# Patient Record
Sex: FEMALE | Race: WHITE | NOT HISPANIC OR LATINO | Employment: STUDENT | URBAN - METROPOLITAN AREA
[De-identification: names, ages, dates, MRNs, and addresses within clinical notes are randomized per-mention and may not be internally consistent; named-entity substitution may affect disease eponyms.]

---
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
## 2021-05-30 VITALS — HEIGHT: 66 IN | WEIGHT: 153 LBS | BODY MASS INDEX: 24.59 KG/M2

## 2021-05-31 VITALS — HEIGHT: 66 IN | WEIGHT: 160.8 LBS | BODY MASS INDEX: 25.84 KG/M2

## 2021-05-31 VITALS — BODY MASS INDEX: 27.79 KG/M2 | HEIGHT: 65 IN | WEIGHT: 166.8 LBS

## 2021-06-01 VITALS — HEIGHT: 66 IN | BODY MASS INDEX: 27 KG/M2 | WEIGHT: 168 LBS

## 2021-06-02 VITALS — WEIGHT: 174.6 LBS | BODY MASS INDEX: 28.18 KG/M2

## 2021-06-10 NOTE — PROGRESS NOTES
Assessment/Plan:     1. Stress  2. Palpitations  No signs of acute concern at this time.  EKG done as below and was normal.  Discussed ongoing options for workup.  Mother and patient do believe that they are somewhat stress related.  Mother is a psychologist and  declined referral.  She plans to work on breathing techniques with patient.  Did recommend that may be helpful for patient to see someone other than mother and they do know some people they can talk to.  Declined the need for medications.  Gave reassurance of normal vitals.  If symptoms worsen or do not improve would consider lab recheck Holter monitor and further workup as needed.  - Electrocardiogram Perform and Read: Which I personally reviewed and interpreted as normal sinus rhythm without any significant concerns.  Comes in today with her mother        Subjective:      Maryjo Kinney is a 16 y.o. female to discuss palpitations.  Is my first time meeting with them so briefly reviewed past medical history and previous notes from care provider.  Reviewed the labs that she had done August 2016.  Thyroid hemoglobin were normal she did have a mild elevated testosterone and plans to recheck that with primary care provider in a few months.  The concern is that patient has had more frequent palpitations.  She states she began feeling aware of changes with her heart about 2 months ago.  She states that they have become more frequent sometimes she can feel it 3-4 times a day.  It will last just a few seconds and will then go back to normal.  She states sometimes she will feel short of breath for those few seconds but then feels okay afterwards.  She does not describe any pain but states it feels like someone is squeezing her heart and sometimes the beat will be rapid for those few seconds.  She has no swelling does not get dizzy or pass out.  She states that it is not related to activity and sometimes it will happen just as she sitting in class.  She has not  tried anything to make it better or worse.  Mom is with her today.  Mom is a psychologist and states that originally they thought it was due to stress.  Patient is a perfectionist and puts a lot of stress on herself a lot of stress in her school work.  She thinks that she seems stressed getting worse.  I do see in her past medical history she is a history of depression but the believe that mood symptoms have been pretty well controlled up until recently with this to stress.  Declines need for any medication or referral to psychologist.  She does not have any palpitations in the office.  Vitals are stable.  Her menstrual cycle has been normal no significant changes in weight.  No swelling in throat.  No other new concerns today.    Current Outpatient Prescriptions   Medication Sig Dispense Refill     ketoconazole (NIZORAL) 2 % cream Apply to the affected area twice daily 30 g 2     No current facility-administered medications for this visit.        Past Medical History, Family History, and Social History reviewed.  Past Medical History:   Diagnosis Date     Depression      Headache      No past surgical history on file.  Review of patient's allergies indicates no known allergies.  Family History   Problem Relation Age of Onset     No Medical Problems Mother      No Medical Problems Father      Hypertension Maternal Grandmother      Hypertension Maternal Grandfather      Heart disease Maternal Grandfather      Social History     Social History     Marital status: Single     Spouse name: N/A     Number of children: N/A     Years of education: N/A     Occupational History     Not on file.     Social History Main Topics     Smoking status: Never Smoker     Smokeless tobacco: Not on file     Alcohol use No     Drug use: No     Sexual activity: No     Other Topics Concern     Not on file     Social History Narrative         Review of systems is as stated in HPI, and the remainder of the 10 system review is otherwise  "negative.    Objective:     Vitals:    04/28/17 1301   BP: 110/78   Pulse: 64   Weight: 153 lb (69.4 kg)   Height: 5' 5.5\" (1.664 m)    Body mass index is 25.07 kg/(m^2).    General Appearance:    Alert, cooperative, no distress, appears stated age   Head:    Normocephalic, without obvious abnormality, atraumatic   Eyes:    PERRL, EOM's intact   Ears:    Normal external ear canals   Nose:   Mucosa normal, no drainage     or sinus tenderness   Throat:   Oropharynx is clear   Neck:   Supple, symmetrical, no adenopathy, no thyromegally, no carotid bruit        Lungs:     Clear to auscultation bilaterally, respirations unlabored   Chest Wall:    No tenderness or deformity    Heart:    Regular rate and rhythm, S1 and S2 normal, no murmur, rub    or gallop       Abdomen:     Soft, non-tender, bowel sounds active all four quadrants,     no masses, no organomegaly           Extremities:   Extremities normal, atraumatic, no cyanosis or edema   Pulses:   2+ and symmetric all extremities   Skin  Psych:   No rashes or lesions  Mood and affect without acute anxiety or psychoses.           This note has been dictated using voice recognition software. Any grammatical or context distortions are unintentional and inherent to the the software.   "

## 2021-06-13 NOTE — PROGRESS NOTES
Assessment/Plan:     1. Migraine  As possibly getting labs referral to neurologist for further workup they declined will give trial of birth control pills to stop hormonal fluctuation and Flexeril since that works well for mom recommended stretching they declined physical therapy or massage.  Color return to care if symptoms worsen or do not improve.  Mother and child are content with plan  - levonorgestrel-ethinyl estradiol (SRONYX) 0.1-20 mg-mcg per tablet; Take 1 tablet by mouth daily.  Dispense: 3 Package; Refill: 3  - cyclobenzaprine (FLEXERIL) 5 MG tablet; Take 1-2 tablets (5-10 mg total) by mouth 3 (three) times a day as needed for muscle spasms.  Dispense: 45 tablet; Refill: 6          Subjective:      Maryjo Kinney is a 17 y.o. female in today to discuss patient is here with her mother.  Mother states that migraines do run in the family and mother has difficulty controlling her's.  Mother states she was recently switched to Flexeril and wonders if that will help the patient because many of the typical medications do not help mother.  Does state that migraines are worse just before her period.  They have tried aspirin and ibuprofen.  She has had her eyes checked recently and states they were okay.  It is worse when she is having stress.  They have tried to change her diet tried to hydrate frequently.  Patient is under significant amount of stress right now because of school.  She is not sexually active.  States her periods are pretty regular they want her birth control pills will help.  They do not like the idea of putting her on regular migraine medication at this time.  She does not currently have a headache but states that they are coming more frequent they have been getting them almost every day now but again worse towards the end of her period.  No neck pain.  Please saw her for palpitations some time ago states this has improved no further workup or treatment needed.    Current Outpatient  "Prescriptions   Medication Sig Dispense Refill     cyclobenzaprine (FLEXERIL) 5 MG tablet Take 1-2 tablets (5-10 mg total) by mouth 3 (three) times a day as needed for muscle spasms. 45 tablet 6     ketoconazole (NIZORAL) 2 % cream Apply to the affected area twice daily 30 g 2     levonorgestrel-ethinyl estradiol (SRONYX) 0.1-20 mg-mcg per tablet Take 1 tablet by mouth daily. 3 Package 3     No current facility-administered medications for this visit.      No Known Allergies  Past Medical History, Family History, and Social History reviewed.  Past Medical History:   Diagnosis Date     Depression      Headache      No past surgical history on file.  Review of patient's allergies indicates no known allergies.  Family History   Problem Relation Age of Onset     No Medical Problems Mother      No Medical Problems Father      Hypertension Maternal Grandmother      Hypertension Maternal Grandfather      Heart disease Maternal Grandfather      Social History     Social History     Marital status: Single     Spouse name: N/A     Number of children: N/A     Years of education: N/A     Occupational History     Not on file.     Social History Main Topics     Smoking status: Never Smoker     Smokeless tobacco: Not on file     Alcohol use No     Drug use: No     Sexual activity: No     Other Topics Concern     Not on file     Social History Narrative         Review of systems is as stated in HPI, and the remainder of the 10 system review is otherwise negative.    Objective:     Vitals:    10/24/17 1612   BP: 112/70   Patient Site: Left Arm   Patient Position: Sitting   Cuff Size: Adult Regular   Pulse: 64   Resp: 20   Temp: 98.2  F (36.8  C)   TempSrc: Oral   Weight: 160 lb 12.8 oz (72.9 kg)   Height: 5' 5.75\" (1.67 m)    Body mass index is 26.15 kg/(m^2).  Wt Readings from Last 3 Encounters:   10/24/17 160 lb 12.8 oz (72.9 kg) (92 %, Z= 1.41)*   04/28/17 153 lb (69.4 kg) (90 %, Z= 1.26)*   08/19/16 167 lb 3.2 oz (75.8 kg) (95 " %, Z= 1.66)*     * Growth percentiles are based on [facility] 2-20 Years data.       General Appearance:    Alert, cooperative, no distress, appears stated age    Head:    Normocephalic, without obvious abnormality, atraumatic   Eyes:    PERRL, EOM's intact, no conjunctivitis    Ears:    Normal TM's and external ear canals   Nose:   Mucosa normal, no drainage     or sinus tenderness   Throat:   Oropharynx is clear   Neck:   Supple, symmetrical, no adenopathy, no thyromegally, no carotid bruit        Lungs:     Clear to auscultation bilaterally, respirations unlabored   Chest Wall:    No tenderness or deformity    Heart:    Regular rate and rhythm, S1 and S2 normal, no murmur, rub    or gallop                   Extremities:   Extremities normal, atraumatic, no cyanosis or edema   Pulses:   2+ and symmetric all extremities   Neuro:   cranial nerves grossly intact, grossly normal sensation and reflexes in extremities no kinesia   Psych:   grossly normal mood and affect without acute anxiety or psychosis    Skin:   No rashes or lesions   Spine:  There is slight flattening of the normal cervical curve with muscle spasm but otherwise normal exam         This note has been dictated using voice recognition software. Any grammatical or context distortions are unintentional and inherent to the software.

## 2021-06-15 NOTE — PROGRESS NOTES
Patient's mother is here today for her own medical appointment and mentions that they would like to try alternative medication for patient's birth control pills.  States that the previously prescribed OCPs cause patient to have flareups of her mood symptoms.  Will send alternative to pharmacy.

## 2021-06-16 NOTE — PROGRESS NOTES
Assessment/Plan:     1. Migraine  2. Back pain  Interested in starting Topamax.  Imitrex did not work for family so they would not like to try this medication.  Fioricet worked for father so gave short supply for significant headaches did caution on barbiturate component.  Did give caution with Topamax that patient absolutely could not get pregnant while taking this medication not currently sexually active per patient and is on birth control pills.  X-rays ordered as below per patient's and chiropractor's request.  I strongly encourage stretching and strengthening as patient has significantly tight muscles and upper thoracic's and this is likely contributing to her symptoms.  - topiramate (TOPAMAX) 25 MG tablet; If after 2 weeks not completely effective and tolerating well may increase to 2 pills nightly and contact office.  Dispense: 60 tablet; Refill: 1  - butalbital-acetaminophen-caffeine (FIORICET, ESGIC) -40 mg per tablet; Take 1-2 tablets by mouth every 6 (six) hours as needed for headaches.  Dispense: 10 tablet; Refill: 0  - XR Cervical Spine 1 VW; which I personally reviewed and interpreted as no acute disease.  - XR Scoliosis AP Standing; personally reviewed and interpreted as slight scoliotic curve in lower thoracic's.  Some rotation in upper lumbar.  No other acute concerns          Subjective:      Maryjo Kinney is a 17 y.o. female here today with father for chronic headaches.  Father states he had headaches when he was younger and it was due to musculoskeletal imbalances.  Patient's father took Fioricet and found it to be helpful.  For patient's mother also suffers from migraines has tried many medications and is still having difficulty controlling.  Patient states that she tried Flexeril and birth control pills.  She felt the birth control pills to be quite helpful in controlling her symptoms.  She is going to a chiropractor and he requested x-rays.  She has not tried other medications.  Not  currently sexually active.  Uses ibuprofen when she has a headache she states that typical headaches make her sensitive to light.  She is getting them about 4-5 times per week in the last few months.    Current Outpatient Prescriptions   Medication Sig Dispense Refill     cyclobenzaprine (FLEXERIL) 5 MG tablet Take 1-2 tablets (5-10 mg total) by mouth 3 (three) times a day as needed for muscle spasms. 45 tablet 6     drospirenone-ethinyl estradiol (YURI, 28,) 3-0.03 mg per tablet Take 1 tablet by mouth daily. 1 Package 11     ketoconazole (NIZORAL) 2 % cream Apply to the affected area twice daily 30 g 2     butalbital-acetaminophen-caffeine (FIORICET, ESGIC) -40 mg per tablet Take 1-2 tablets by mouth every 6 (six) hours as needed for headaches. 10 tablet 0     topiramate (TOPAMAX) 25 MG tablet If after 2 weeks not completely effective and tolerating well may increase to 2 pills nightly and contact office. 60 tablet 1     No current facility-administered medications for this visit.      No Known Allergies  Past Medical History, Family History, and Social History reviewed.  Past Medical History:   Diagnosis Date     Depression      Headache      No past surgical history on file.  Review of patient's allergies indicates no known allergies.  Family History   Problem Relation Age of Onset     No Medical Problems Mother      No Medical Problems Father      Hypertension Maternal Grandmother      Hypertension Maternal Grandfather      Heart disease Maternal Grandfather      Social History     Social History     Marital status: Single     Spouse name: N/A     Number of children: N/A     Years of education: N/A     Occupational History     Not on file.     Social History Main Topics     Smoking status: Never Smoker     Smokeless tobacco: Not on file     Alcohol use No     Drug use: No     Sexual activity: No     Other Topics Concern     Not on file     Social History Narrative         Review of systems is as stated  "in HPI, and the remainder of the 10 system review is otherwise negative.    Objective:     Vitals:    02/16/18 0852   BP: 110/62   Patient Site: Right Arm   Patient Position: Sitting   Cuff Size: Adult Regular   Pulse: 84   SpO2: 98%   Weight: 166 lb 12.8 oz (75.7 kg)   Height: 5' 5\" (1.651 m)    Body mass index is 27.76 kg/(m^2).  Wt Readings from Last 3 Encounters:   02/16/18 166 lb 12.8 oz (75.7 kg) (94 %, Z= 1.52)*   10/24/17 160 lb 12.8 oz (72.9 kg) (92 %, Z= 1.41)*   04/28/17 153 lb (69.4 kg) (90 %, Z= 1.26)*     * Growth percentiles are based on [facility] 2-20 Years data.       General Appearance:    Alert, cooperative, no distress, appears stated age    Head:    Normocephalic, without obvious abnormality, atraumatic   Eyes:    PERRL, EOM's intact, no conjunctivitis    Ears:    Normal TM's and external ear canals   Nose:   Mucosa normal, no drainage     or sinus tenderness   Throat:   Oropharynx is clear   Neck:   Supple, symmetrical, no adenopathy, no thyromegally, no carotid bruit        Lungs:     Clear to auscultation bilaterally, respirations unlabored   Chest Wall:    No tenderness or deformity    Heart:    Regular rate and rhythm, S1 and S2 normal, no murmur, rub    or gallop                   Extremities:   Extremities normal, atraumatic, no cyanosis or edema   Pulses:   2+ and symmetric all extremities   Neuro:   cranial nerves grossly intact, grossly normal sensation and reflexes in extremities    Psych:   grossly normal mood and affect without acute anxiety or psychosis    Skin:   No rashes or lesions   Spine:  Patient has significantly poor posture and cervical and upper thoracic's are slumped forward.  She is able to stand up straight but this appears to give her some functional kyphosis.  Upper thoracic muscles are quite tight.  I am able to palpate a mild scoliotic curve in the low thoracic region.  No significant spinal abnormalities in the lumbar region.  Patient overall has good range of motion. "         This note has been dictated using voice recognition software. Any grammatical or context distortions are unintentional and inherent to the software.

## 2021-06-20 NOTE — PROGRESS NOTES
"Assessment:     1. History of migraine headaches  Ambulatory referral to Neurology       Plan:     1. History of migraine headaches  I will renew the patient's medication specifically Fioricet and Topamax until and after neurology will consult us with regards to other migraine approaches    2.  Test anxiety  - Ambulatory referral to Neurology      Subjective:   I am seeing the patient for the first time she has had migraine headaches for 3 years she is taking Fioricet and Topamax at the age of 18 for control and relief of headaches.  Patient has not had neurological consultation or workup.  Strong family history migraine headaches.  Patient is on birth control pill.  Patient also has test anxiety and needs 1-1/2 times more preparation time before taking test evaluations than normal according to the history given by her mother.  Patient needs a letter stating that she needs seclusion and time and a half to accommodate her needs.  We will supply this letter based on a conceivable and believable history.  Medications will be renewed temporarily until neurologic consultation is obtained.  Systems review otherwise unremarkable letter [id number] minutes.  Medication review done.  We will follow-up after neurological consultation.    Review of Systems: A complete 14 point review of systems was obtained and is negative or as stated in the history of present illness.    Past Medical History:   Diagnosis Date     Depression      Headache      Family History   Problem Relation Age of Onset     No Medical Problems Mother      No Medical Problems Father      Hypertension Maternal Grandmother      Hypertension Maternal Grandfather      Heart disease Maternal Grandfather      No past surgical history on file.  Social History   Substance Use Topics     Smoking status: Never Smoker     Smokeless tobacco: Never Used     Alcohol use No         Objective:   /60  Pulse 68  Ht 5' 6\" (1.676 m)  Wt 168 lb (76.2 kg)  BMI 27.12 " kg/m2    General Appearance:  Alert, cooperative, no distress  Head:  Normocephalic, no obvious abnormality  Ears: TM anatomy normal  Eyes:  PERRL, EOM's intact, conjunctiva and corneas clear  Nose:  Nares symmetrical, septum midline, mucosa pink, no sinus tenderness  Throat:  Lips, tongue, and mucosa are moist, pink, and intact  Neck:  Supple, symmetrical, trachea midline, no adenopathy; thyroid: no enlargement, symmetric,no tenderness/mass/nodules; no carotid bruit, no JVD  Back:  Symmetrical, no curvature, ROM normal, no CVA tenderness  Chest/Breast:  No mass or tenderness  Lungs:  Clear to auscultation bilaterally, respirations unlabored   Heart:  Normal PMI, regular rate & rhythm, S1 and S2 normal, no murmurs, rubs, or gallops  Abdomen:  Soft, non-tender, bowel sounds active all four quadrants, no mass, or organomegaly  Musculoskeletal:  Tone and strength strong and symmetrical, all extremities  Lymphatic:  No adenopathy  Skin/Hair/Nails:  Skin warm, dry, and intact, no rashes  Neurologic:  Alert and oriented x3, no cranial nerve deficits, normal strength and tone, gait steady  Extremities:  No edema.  Erica's sign negative.    Genitourinary: deferred  Pulses:  Equal bilaterally           This note has been dictated using voice recognition software. Any grammatical or context distortions are unintentional and inherent to the the software.

## 2021-06-21 NOTE — PROGRESS NOTES
"Provider wore a mask during this visit.   Subjective:   Maryjo Kinney is a 18 y.o. female  Roomed by: Joanne Perry    Refills needed? No    Do you have any forms that need to be filled out? No      Chief Complaint   Patient presents with     Cough     x10 days     Fever     Ear Pain     Sore Throat   Symptoms started with nasal congestion and runny nose. Then started coughing and ear pain. In the morning says can't talk, \"nothing comes out.\" Admits recent fevers. Admits some chest discomfort. Says breathing feels compressed. Admits that patient has been eating, drinking and urinating normally.  Denies any recent belly pain, vomiting or diarrhea. Admits fatigue and sleeping more. Admits body aches for about a week. She missed a class yesterday. Says her headaches have been constant.   Review of Systems  See HPI for ROS, otherwise balance of other systems negative    No Known Allergies    Current Outpatient Prescriptions:      butalbital-acetaminophen-caffeine (FIORICET, ESGIC) -40 mg per tablet, TAKE 1-2 TABLETS BY MOUTH EVERY 6 HOURS AS NEEDED FOR HEADACHES, Disp: 10 tablet, Rfl: 0     cyclobenzaprine (FLEXERIL) 5 MG tablet, Take 1-2 tablets (5-10 mg total) by mouth 3 (three) times a day as needed for muscle spasms., Disp: 45 tablet, Rfl: 6     drospirenone-ethinyl estradiol (YURI, 28,) 3-0.03 mg per tablet, Take 1 tablet by mouth daily., Disp: 1 Package, Rfl: 11     ketoconazole (NIZORAL) 2 % cream, Apply to the affected area twice daily, Disp: 30 g, Rfl: 2     topiramate (TOPAMAX) 25 MG tablet, TAKE 1 TABLET BY MOUTH EVERY NIGHT. CAN INCREASE TO 2 TABLETS IF NOT EFFECTIVE AND TOLERATING WELL. CONTACT OFFICE IF INCREASE TO 2 TABLETS, Disp: 60 tablet, Rfl: 0     FLUZONE QUAD 8625-1493, PF, 60 mcg (15 mcg x 4)/0.5 mL Syrg injection, ADM 0.5ML IM UTD, Disp: , Rfl: 0  There is no problem list on file for this patient.    Past Medical History:   Diagnosis Date     Depression      Headache          Objective: "     Vitals:    10/30/18 1503   BP: 124/70   Patient Site: Right Arm   Patient Position: Sitting   Cuff Size: Adult Regular   Pulse: 106   Resp: 16   Temp: 99  F (37.2  C)   TempSrc: Oral   SpO2: 98%   Weight: 174 lb 9.6 oz (79.2 kg)   Gen - Pt in NAD  Eyes - Conjunctiva non injected, no drainage  Face - non TTP over frontal sinus areas; non TTP over maxillary areas  Ears - external canals - no induration, Right TM - not injected, Left TM - not injected   Nose - not congested, no nasal drainage  Pharynx - mildly injected, tonsils 1+ size  Neck - supple, no cervical adenopathy, no masses  Cor - RRR w/o murmur  Lungs - Very poor air entry; no wheezes or crackles noted on auscultation - no coughing noted  Skin - no lesions, no rashes noted    Results for orders placed or performed in visit on 10/30/18   Rapid Strep A Screen-Throat swab   Result Value Ref Range    Rapid Strep A Antigen No Group A Strep detected, presumptive negative No Group A Strep detected, presumptive negative   Lab result discussed on day of visit.     Xr Chest 2 Views    Result Date: 10/30/2018  XR CHEST 2 VIEWS 10/30/2018 4:08 PM INDICATION: Reduced chest expansion. Poor inspiration on exam. COMPARISON: None. FINDINGS: Heart size and pulmonary vessels are normal. Lungs are clear. Minimal scoliosis of upper thoracic spine.  Radiologist's report discussed with patient on day of visit.      Assessment - Plan   Medical Decision Making -18-year-old presents with 10 days of URI symptoms and intermittent coughing.  Her main complaint is sore throat.  Because of patient's poor inspiration a chest x-ray was done that was negative for pneumonia, pleural effusion or pneumothorax.  Because of her duration of symptoms, patient makes criteria for acute sinusitis    1. Acute sinusitis  - doxycycline (MONODOX) 100 MG capsule; Take 1 capsule (100 mg total) by mouth 2 (two) times a day for 7 days.  Dispense: 14 capsule; Refill: 0    2. Reduced chest expansion on  inspiration  - XR Chest 2 Views; Future    3. Throat pain  - Rapid Strep A Screen-Throat swab  - Group A Strep, RNA Direct Detection, Throat  - alum/mag hydrox-simethicone-diphenhydramine-lidocaine (MAGIC MOUTHWASH) suspension; Swish and spit 15 mL 4 (four) times a day as needed (sore throat).  Dispense: 120 mL; Refill: 0    At the conclusion of the encounter, assessment and plan were discussed.   All questions were answered.   The patient or guardian acknowledged understanding and was involved in the decision making regarding the overall care plan.    Patient Instructions     1. Keep well hydrated  2. May alternate Tylenol every 6 hours with ibuprofen every 6 hours as needed for fever or pain  3. If symptoms not improved after completing antibiotics, follow up with primary  4. If you have any questions, call the clinic number - it's answered 24/7    Sinus infection  Acute bacterial rhinosinusitis (ABRS) is an infection of your nasal cavity and sinuses. It s caused by bacteria. Acute means that you ve had symptoms for less than 12 weeks.  Understanding your sinuses  The nasal cavity is the large air-filled space behind your nose. The sinuses are a group of spaces formed by the bones of your face. They connect with your nasal cavity. ABRS causes the tissue lining these spaces to become inflamed. Mucus may not drain normally. This leads to facial pain and other symptoms.  What causes ABRS?  ABRS most often follows an upper respiratory infection caused by a virus. Bacteria then infect the lining of your nasal cavity and sinuses. But you can also get ABRS if you have:    Nasal allergies    Long-term nasal swelling and congestion not caused by allergies    Blockage in the nose  Symptoms of ABRS  The symptoms of ABRS may be different for each person, and can include:    Nasal congestion    Runny nose    Fluid draining from the nose down the throat (postnasal drip)    Headache    Cough    Pain in the sinuses    Thick,  colored fluid from the nose (mucus)    Fever  Diagnosing ABRS  ABRS may be diagnosed if you ve had an upper respiratory infection like a cold and cough for longer than 10 to 14 days. Your health care provider will ask about your symptoms and your medical history. The provider will check your vital signs, including your temperature. You ll have a physical exam. The health care provider will check your ears, nose, and throat. You likely won t need any tests. If ABRS comes back, you may have a culture or other tests.  Treatment for ABRS  Treatment may include:    Antibiotic medicine. This is for symptoms that last for at least 10 to 14 days.    Nasal corticosteroid medicine. Drops or spray used in the nose can lessen swelling and congestion.    Over-the-counter pain medicine. This is to lessen sinus pain and pressure.    Nasal decongestant medicine. Spray or drops may help to lessen congestion. Do not use them for more than a few days.    Salt wash (saline irrigation). This can help to loosen mucus.  Possible complications of ABRS  ABRS may come back or become long-term (chronic).  In rare cases, ABRS may cause complications such as:     Inflamed tissue around the brain and spinal cord (meningitis)    Inflamed tissue around the eyes (orbital cellulitis)    Inflamed bones around the sinuses (osteitis)  These problems may need to be treated in a hospital with intravenous (IV) antibiotic medicine or surgery.  When to call the health care provider  Call your health care provider if you have any of the following:    Symptoms that don t get better, or get worse    Symptoms that don t get better after 3 to 5 days on antibiotics    Trouble seeing    Swelling around your eyes    Confusion or trouble staying awake   Date Last Reviewed: 3/3/2015    4440-5214 [employer]. [address] [place] [address], [place] [address]. All rights reserved. This information is not intended as a substitute for professional medical care.  Always follow your healthcare professional's instructions.

## 2023-05-08 ENCOUNTER — HOSPITAL ENCOUNTER (EMERGENCY)
Facility: CLINIC | Age: 23
Discharge: HOME | End: 2023-05-08
Attending: EMERGENCY MEDICINE | Admitting: EMERGENCY MEDICINE
Payer: OTHER MISCELLANEOUS

## 2023-05-08 VITALS
TEMPERATURE: 98.2 F | OXYGEN SATURATION: 100 % | RESPIRATION RATE: 16 BRPM | SYSTOLIC BLOOD PRESSURE: 149 MMHG | DIASTOLIC BLOOD PRESSURE: 83 MMHG | HEART RATE: 79 BPM

## 2023-05-08 DIAGNOSIS — S61.412A LACERATION OF LEFT HAND WITHOUT FOREIGN BODY, INITIAL ENCOUNTER: ICD-10-CM

## 2023-05-08 DIAGNOSIS — Z23 NEED FOR DIPHTHERIA-TETANUS-PERTUSSIS (TDAP) VACCINE: ICD-10-CM

## 2023-05-08 PROBLEM — E78.1 HYPERTRIGLYCERIDEMIA: Status: ACTIVE | Noted: 2023-03-30

## 2023-05-08 PROCEDURE — 99283 EMERGENCY DEPT VISIT LOW MDM: CPT | Mod: 25

## 2023-05-08 PROCEDURE — 90715 TDAP VACCINE 7 YRS/> IM: CPT | Performed by: EMERGENCY MEDICINE

## 2023-05-08 PROCEDURE — 90471 IMMUNIZATION ADMIN: CPT | Performed by: EMERGENCY MEDICINE

## 2023-05-08 PROCEDURE — 250N000011 HC RX IP 250 OP 636: Performed by: EMERGENCY MEDICINE

## 2023-05-08 PROCEDURE — 12001 RPR S/N/AX/GEN/TRNK 2.5CM/<: CPT

## 2023-05-08 RX ADMIN — CLOSTRIDIUM TETANI TOXOID ANTIGEN (FORMALDEHYDE INACTIVATED), CORYNEBACTERIUM DIPHTHERIAE TOXOID ANTIGEN (FORMALDEHYDE INACTIVATED), BORDETELLA PERTUSSIS TOXOID ANTIGEN (GLUTARALDEHYDE INACTIVATED), BORDETELLA PERTUSSIS FILAMENTOUS HEMAGGLUTININ ANTIGEN (FORMALDEHYDE INACTIVATED), BORDETELLA PERTUSSIS PERTACTIN ANTIGEN, AND BORDETELLA PERTUSSIS FIMBRIAE 2/3 ANTIGEN 0.5 ML: 5; 2; 2.5; 5; 3; 5 INJECTION, SUSPENSION INTRAMUSCULAR at 08:03

## 2023-05-08 NOTE — ED PROVIDER NOTES
EMERGENCY DEPARTMENT ENCOUNTER      NAME: Maryjo Kinney  AGE: 22 year old female  YOB: 2000  MRN: [id number]  EVALUATION DATE & TIME: No admission date for patient encounter.    PCP: Niraj Grey    ED PROVIDER: Jaqui Low MD    Chief Complaint   Patient presents with     Laceration         FINAL IMPRESSION:  1. Laceration of left hand without foreign body, initial encounter    2. Need for diphtheria-tetanus-pertussis (Tdap) vaccine          ED COURSE & MEDICAL DECISION MAKING:    Pertinent Labs & Imaging studies reviewed. (See chart for details)  22 year old female presents to the Emergency Department for evaluation of laceration.     Accidental laceration with clean knife on 5/8/23 at 0630. Laceration present between left thumb and index finger. Clean, no purulent drainage or erythema. No sings of ligament, nerve, or tendon involvent with intact strength and sensation. Repaired with three sutures of 5-0 ethilon. See procedure note below. Due for Tdap. Instructed to follow up with PCP for suture removal in about one week.     ED Course as of 05/08/23 0816   Mon May 08, 2023   0730 The resident met with the patient and performed an initial examination. They then staffed the patient with me. We discussed the patient's presentation and plan for ED workup.       At the conclusion of the encounter I discussed the results of all of the tests and the disposition. The questions were answered. The patient or family acknowledged understanding and was agreeable with the care plan.     MEDICATIONS GIVEN IN THE EMERGENCY:  Medications   Tdap (tetanus-diphtheria-acell pertussis) (ADACEL) injection 0.5 mL (0.5 mLs Intramuscular $Given 5/8/23 0803)       NEW PRESCRIPTIONS STARTED AT TODAY'S ER VISIT  New Prescriptions    No medications on file          =================================================================    HPI    Patient information was obtained from: patient    Use of : N/A      Maryjo MORRISSEY  Nirmal is a 22 year old female with a pertinent history of anxiety/depression, migraines who presents to this ED for evaluation of left hand laceration.     She was at work this morning cutting open a box with a knife when the knife cut into her left hand. Knife was clean, no rust. Wound in the web between her thumb and index finger. She reports her pain is 4 out of 10. She is not having any numbness, tingling or weakness. She is not sure how deep the wound is. She is not sure when her last tetanus vaccine was.       REVIEW OF SYSTEMS   10 point ROS negative unless stated otherwise in HPI    PAST MEDICAL HISTORY:  Depression, anxiety, migraines    PAST SURGICAL HISTORY:  History reviewed. No pertinent surgical history.        CURRENT MEDICATIONS:    alum/mag hydrox-simethicone-diphenhydramine-lidocaine (MAGIC MOUTHWASH) suspension  butalbital-acetaminophen-caffeine (FIORICET, ESGIC) -40 mg per tablet  cyclobenzaprine (FLEXERIL) 5 MG tablet  drospirenone-ethinyl estradiol (YURI, 28,) 3-0.03 mg per tablet  ketoconazole (NIZORAL) 2 % cream  topiramate (TOPAMAX) 25 MG tablet        ALLERGIES:  No Known Allergies    FAMILY HISTORY:  Family History   Problem Relation Age of Onset     No Known Problems Mother      No Known Problems Father      Hypertension Maternal Grandmother      Hypertension Maternal Grandfather      Heart Disease Maternal Grandfather        SOCIAL HISTORY:   Social History     Socioeconomic History     Marital status: Single     Spouse name: None     Number of children: None     Years of education: None     Highest education level: None   Tobacco Use     Smoking status: Never     Smokeless tobacco: Never   Substance and Sexual Activity     Alcohol use: No     Drug use: No     Sexual activity: Never       VITALS:  BP (!) 149/83   Pulse 79   Temp 98.2  F (36.8  C) (Oral)   Resp 16   LMP  (LMP Unknown)   SpO2 100%     PHYSICAL EXAM    Constitutional: Well developed, Well nourished, NAD  HENT:  Normocephalic, Atraumatic  Eyes: EOMI, Conjunctiva normal, No discharge.   Respiratory: Normal breath sounds, breathing comfortably on room air  Cardiovascular: extremities appear well perfused, normal heart rate  GI: No excessive obesity  Musculoskeletal:  Strength of left hand intact including abduction, adduction of left thumb and first index finger.   Integument: 1.2 cm laceration web between left thumb and index finger. No purulent drainage. No surrounding erythema.   Neurologic: Alert & oriented x 3, Normal motor function, Normal sensory function of left hand  Psychiatric: Affect slightly anxious, Judgment normal, Mood normal. Cooperative.      LAB:  All pertinent labs reviewed and interpreted.       RADIOLOGY:  Reviewed all pertinent imaging. Please see official radiology report.  No orders to display       EKG:    none    PROCEDURES:     Lac Repair  Date/Time: 5/8/23 at 0730  Performed by: Kassandra Horn, supervised by Jaqui Low MD  Consent: Verbal consent obtained.  Risks and benefits: risks, benefits and alternatives were discussed  Consent given by: patient  Patient understanding: patient states understanding of the procedure being performed  Patient identity confirmed: verbally with patient  Time out: Immediately prior to procedure verified the correct patient, procedure, equipment, support staff and site/side marked as required.  Location: left hand web between thumb and first inde finger  Length: 1.2 cm  Depth: approx 0.5 cm  Anesthesia: local infiltration  Local anesthetic: 1% lidocaine  Irrigation solution: saline  Amount of cleaning: standard  Debridement: none  Number of sutures and type: three sutures with 5-0 ethilon  Approximation: close  Approximation difficulty: simple  Dressing: 4x4 sterile gauze  Patient tolerance: Patient tolerated the procedure well with no immediate complications.        Janis Horn MD PGY2  [facility] Residency  05/08/23    Discussed with Attending  Kassandra Clarke MD  Resident  05/08/23 0830       Kassandra Horn MD  Resident  05/08/23 0981

## 2023-05-08 NOTE — ED PROVIDER NOTES
I, Jaqui Low have reviewed the documentation, personally taken the patient's history, performed an exam and agree with the physical finds, diagnosis and management plan.    HPI:  The patient reports she was at work this morning and around 0630 was using a knife to cut strawberries and accidentally sliced the web space between her left thumb and index finger resulting in a laceration. She has been able to move and use her fingers as normal after sustaining the laceration. She is unsure when her last tetanus immunization was.    Physical Exam:  1.2 cm superficial laceration to the web space between 1st and 2nd digits of the left hand, intrinsic movements and sensation intact.    MDM: Laceration.  No evidence of tendon, ligament, neurovascular involvement    ED Course/workup:   Laceration repaired, please see procedure note.  Discharged home.  ED Course as of 05/08/23 0828   Mon May 08, 2023   0730 The resident met with the patient and performed an initial examination. They then staffed the patient with me. We discussed the patient's presentation and plan for ED workup.     Procedures: I was present for key portions of the following procedures: Laceration repair    Final Diagnosis:     ICD-10-CM    1. Laceration of left hand without foreign body, initial encounter  S61.412A       2. Need for diphtheria-tetanus-pertussis (Tdap) vaccine  Z23             Medical Decision Making    History:    Supplemental history from: N/A    External Record(s) reviewed: Other: [facility] immunization records    Work Up:    Chart documentation includes differential considered and any EKGs or imaging independently interpreted by provider, where specified.    In additional to work up documented, I considered the following work up: Documented in chart, if applicable.    External consultation:    Discussion of management with another provider: Documented in chart, if applicable    Complicating factors:    Care impacted  by chronic illness: Hyperlipidemia    Care affected by social determinants of health: Access to Medical Care    Disposition considerations: Discharge. No recommendations on prescription strength medication(s). N/A.        I personally saw the patient and performed a substantive portion of the visit including all aspects of the medical decision making.     I, Lucio Francis, am serving as a scribe to document services personally performed by Jaqui Low MD based on my observations and the provider's statements to me.  I, Jaqui Low MD, attest that Lucio Francis is acting in a scribe capacity, has observed my performance of the services and has documented them in accordance with my direction.    MD Devante Hawkins Zabrina N, MD  05/08/23 0500

## 2023-05-08 NOTE — ED TRIAGE NOTES
Working in the [employer]i sliced web between left thumb and finger with knife.  Unsure of tetanus status     Triage Assessment     Row Name 05/08/23 0711       Triage Assessment (Adult)    Airway WDL WDL       Respiratory WDL    Respiratory WDL WDL       Skin Circulation/Temperature WDL    Skin Circulation/Temperature WDL WDL       Cardiac WDL    Cardiac WDL WDL       Peripheral/Neurovascular WDL    Peripheral Neurovascular WDL WDL       Cognitive/Neuro/Behavioral WDL    Cognitive/Neuro/Behavioral WDL WDL

## 2023-05-09 ENCOUNTER — NURSE TRIAGE (OUTPATIENT)
Dept: NURSING | Facility: CLINIC | Age: 23
End: 2023-05-09
Payer: COMMERCIAL

## 2023-05-09 NOTE — TELEPHONE ENCOUNTER
Nurse Triage SBAR    Situation:   -ED follow up    Background:   -Patient calling, It is okay to leave a detailed message at this number.     Assessment:   -seen in  ED for stiches  -cut hand with dirty knife, got 3 sutures  -pain was 4/10, now is 7/10  -pain is moving up wrist  -no numbness or tingling  -moving  more due to work  -taking tylenol (on naproxen can not take ibuprofen)  -requesting work restrictions  -no signs on infecton    Recommendation:   -rest and ice  -if pain continues go t to /[facility] for re-evaulateion  -call PCP to follow up with pain control needs and work restriction notes  -call back with any questions or concerns    DAVI YEN RN on 5/9/2023 at 11:41 AM      Reason for Disposition    Care of sutured (or stapled) wound, questions about    Additional Information    Negative: Major abdominal surgical wound and visible internal organs    Negative: Sounds like a life-threatening emergency to the triager    Negative: Surgical incision symptoms and questions    Negative: Wound looks infected    Negative: New cut and caller wonders if it needs stitches    Negative: Bleeding won't stop after 10 minutes of direct pressure (using correct technique)    Negative: Patient sounds very sick or weak to the triager    Negative: Wound gaping open after sutures (or staples) AND < 48 hours since wound re-opened    Negative: Major surgical wound that is starting to open up    Negative: Wound gaping open after sutures (or staples) AND > 48 hours since wound re-opened AND length of opening > 1/2 inch (12 mm)    Negative: Suture (or staple) came out early and > 48 hours since sutures placed, and caller wants wound checked    Negative: Face wound gaping open after sutures (or staples) AND > 48 hours since wound re-opened AND length of opening > 1/4 inch (6 mm)    Negative: Suture or staple removal is overdue    Negative: Patient wants to be seen    Negative: Numbness extends beyond the wound edges and lasts  > 8 hours    Negative: Suture (or staple) came out early, and wound not gaping or gaping slightly    Protocols used: SUTURE OR STAPLE [id number]